# Patient Record
Sex: FEMALE | URBAN - METROPOLITAN AREA
[De-identification: names, ages, dates, MRNs, and addresses within clinical notes are randomized per-mention and may not be internally consistent; named-entity substitution may affect disease eponyms.]

---

## 2021-11-10 ENCOUNTER — APPOINTMENT (RX ONLY)
Dept: URBAN - METROPOLITAN AREA CLINIC 145 | Facility: CLINIC | Age: 61
Setting detail: DERMATOLOGY
End: 2021-11-10

## 2021-11-10 VITALS — WEIGHT: 200 LBS | HEIGHT: 64 IN

## 2021-11-10 DIAGNOSIS — L95.8 OTHER VASCULITIS LIMITED TO THE SKIN: ICD-10-CM

## 2021-11-10 PROBLEM — L30.9 DERMATITIS, UNSPECIFIED: Status: ACTIVE | Noted: 2021-11-10

## 2021-11-10 PROCEDURE — ? BIOPSY BY PUNCH METHOD

## 2021-11-10 PROCEDURE — 11105 PUNCH BX SKIN EA SEP/ADDL: CPT

## 2021-11-10 PROCEDURE — 11104 PUNCH BX SKIN SINGLE LESION: CPT

## 2021-11-10 PROCEDURE — ? COUNSELING

## 2021-11-10 PROCEDURE — ? ORDER TESTS

## 2021-11-10 ASSESSMENT — LOCATION ZONE DERM: LOCATION ZONE: LEG

## 2021-11-10 ASSESSMENT — LOCATION DETAILED DESCRIPTION DERM
LOCATION DETAILED: LEFT PROXIMAL PRETIBIAL REGION
LOCATION DETAILED: RIGHT PROXIMAL LATERAL PRETIBIAL REGION

## 2021-11-10 ASSESSMENT — LOCATION SIMPLE DESCRIPTION DERM
LOCATION SIMPLE: LEFT PRETIBIAL REGION
LOCATION SIMPLE: RIGHT LOWER LEG

## 2021-11-10 NOTE — PROCEDURE: COUNSELING
Detail Level: Detailed
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 Patient with history of sjogren syndrome and recent worsening arthritis

## 2021-11-10 NOTE — PROCEDURE: MIPS QUALITY
Quality 402: Tobacco Use And Help With Quitting Among Adolescents: Patient screened for tobacco and never smoked
Detail Level: Detailed
Quality 154 Part A: Falls: Risk Assessment (Should Be Reported With Measure 155.): Falls risk assessment completed and documented in the past 12 months.
Quality 110: Preventive Care And Screening: Influenza Immunization: Influenza Immunization Administered during Influenza season
Quality 111:Pneumonia Vaccination Status For Older Adults: Pneumococcal Vaccination not Administered or Previously Received, Reason not Otherwise Specified
Quality 47: Advance Care Plan: Advance care planning not documented, reason not otherwise specified.
Quality 154 Part B: Falls: Risk Screening (Should Be Reported With Measure 155.): No documentation of falls status
Quality 431: Preventive Care And Screening: Unhealthy Alcohol Use - Screening: Patient not identified as an unhealthy alcohol user when screened for unhealthy alcohol use using a systematic screening method
Quality 226: Preventive Care And Screening: Tobacco Use: Screening And Cessation Intervention: Patient screened for tobacco use and is an ex/non-smoker

## 2021-11-10 NOTE — PROCEDURE: BIOPSY BY PUNCH METHOD
Hemostasis: Aluminum Chloride and Electrocautery
Post-Care Instructions: I reviewed with the patient in detail post-care instructions. Patient is to keep the biopsy site dry overnight, and then apply bacitracin twice daily until healed. Patient may apply hydrogen peroxide soaks to remove any crusting.
Suture Removal: 21 days
Validate Triangulation: No
Anesthesia Volume In Cc: 0.5
X Size Of Lesion In Cm (Optional): 0
Billing Type: Third-Party Bill
Validate Note Data (See Information Below): Yes
Dressing: bandage
Epidermal Sutures: 4-0 Nylon
Anesthesia Type: 1% lidocaine with epinephrine and a 1:10 solution of 8.4% sodium bicarbonate
Notification Instructions: Patient will be notified of biopsy results. However, patient instructed to call the office if not contacted within 2 weeks.
Biopsy Type: H and E
Suture Removal: 14 days
Detail Level: Detailed
Information: Selecting Yes will display possible errors in your note based on the variables you have selected. This validation is only offered as a suggestion for you. PLEASE NOTE THAT THE VALIDATION TEXT WILL BE REMOVED WHEN YOU FINALIZE YOUR NOTE. IF YOU WANT TO FAX A PRELIMINARY NOTE YOU WILL NEED TO TOGGLE THIS TO 'NO' IF YOU DO NOT WANT IT IN YOUR FAXED NOTE.
Home Suture Removal Text: Patient was provided a home suture removal kit and will remove their sutures at home.  If they have any questions or difficulties they will call the office.
Punch Size In Mm: 5
Biopsy Type: DIF
Wound Care: Petrolatum
Consent: Written consent was obtained and risks were reviewed including but not limited to scarring, infection, bleeding, scabbing, incomplete removal, nerve damage and allergy to anesthesia.
Punch Size In Mm: 4

## 2021-12-01 ENCOUNTER — APPOINTMENT (RX ONLY)
Dept: URBAN - METROPOLITAN AREA CLINIC 145 | Facility: CLINIC | Age: 61
Setting detail: DERMATOLOGY
End: 2021-12-01

## 2021-12-01 DIAGNOSIS — M31.0 HYPERSENSITIVITY ANGIITIS: ICD-10-CM | Status: INADEQUATELY CONTROLLED

## 2021-12-01 PROCEDURE — 99214 OFFICE O/P EST MOD 30 MIN: CPT

## 2021-12-01 PROCEDURE — ? LAB REPORTS REVIEWED

## 2021-12-01 PROCEDURE — ? ORDER TESTS

## 2021-12-01 PROCEDURE — ? COUNSELING

## 2021-12-01 PROCEDURE — ? SUTURE REMOVAL (NO GLOBAL PERIOD)

## 2021-12-01 ASSESSMENT — LOCATION ZONE DERM: LOCATION ZONE: LEG

## 2021-12-01 ASSESSMENT — LOCATION SIMPLE DESCRIPTION DERM
LOCATION SIMPLE: LEFT PRETIBIAL REGION
LOCATION SIMPLE: RIGHT LOWER LEG

## 2021-12-01 NOTE — PROCEDURE: MIPS QUALITY
Quality 111:Pneumonia Vaccination Status For Older Adults: Pneumococcal Vaccination not Administered or Previously Received, Reason not Otherwise Specified
Quality 431: Preventive Care And Screening: Unhealthy Alcohol Use - Screening: Patient not identified as an unhealthy alcohol user when screened for unhealthy alcohol use using a systematic screening method
Quality 402: Tobacco Use And Help With Quitting Among Adolescents: Patient screened for tobacco and never smoked
Quality 47: Advance Care Plan: Advance care planning not documented, reason not otherwise specified.
Quality 226: Preventive Care And Screening: Tobacco Use: Screening And Cessation Intervention: Patient screened for tobacco use and is an ex/non-smoker
Quality 154 Part B: Falls: Risk Screening (Should Be Reported With Measure 155.): No documentation of falls status
Quality 110: Preventive Care And Screening: Influenza Immunization: Influenza Immunization Administered during Influenza season
Quality 154 Part A: Falls: Risk Assessment (Should Be Reported With Measure 155.): Falls risk assessment completed and documented in the past 12 months.
Detail Level: Detailed

## 2022-05-26 NOTE — PROCEDURE: COUNSELING
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 Patient with history of sjogren syndrome and recent worsening arthritis.\\nNormal CBC and CMP ordered by PCP. Other screening labs normal. Urinalysis ordered today to ensure no renal involvement. \\nAsymptomatic.
Detail Level: Detailed
[Confused or Disoriented] : no confusion
[Memory Lapses or Loss] : no memory loss
[Changed Thought Patterns] : no change in thought patterns
[Repeating Questions] : no repeated questioning about recent events
[Facial Weakness] : no facial weakness
[Arm Weakness] : no arm weakness
[Hand Weakness] : no hand weakness
[Leg Weakness] : no leg weakness
[Poor Coordination] : poor coordination
[Difficulty Writing] : no difficulty writing
[Difficulties in Speech] : no speech difficulties
[Numbness] : no numbness
[Tingling] : no tingling
[Seizures] : no convulsions
[Fainting] : no fainting
[Vertigo] : no vertigo
[Migraine Headache] : no migraine headache
[Difficulty Walking] : no difficulty walking
[Frequent Falls] : frequent falls
[As Noted in HPI] : as noted in HPI
[Suicidal] : not suicidal
[Sleep Disturbances] : no sleep disturbances
[Anxiety] : anxiety
[Depression] : no depression
[Change In Personality] : no personality change
[Emotional Problems] : no emotional problems
[Negative] : Heme/Lymph